# Patient Record
Sex: MALE | Race: WHITE
[De-identification: names, ages, dates, MRNs, and addresses within clinical notes are randomized per-mention and may not be internally consistent; named-entity substitution may affect disease eponyms.]

---

## 2023-04-09 ENCOUNTER — HOSPITAL ENCOUNTER (EMERGENCY)
Dept: HOSPITAL 33 - ED | Age: 38
Discharge: ANOTHER HOSPITAL | End: 2023-04-09
Payer: SELF-PAY

## 2023-04-09 VITALS — HEART RATE: 92 BPM | DIASTOLIC BLOOD PRESSURE: 69 MMHG | OXYGEN SATURATION: 97 % | SYSTOLIC BLOOD PRESSURE: 119 MMHG

## 2023-04-09 DIAGNOSIS — S81.812A: Primary | ICD-10-CM
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
## 2023-04-09 DIAGNOSIS — Z72.0: ICD-10-CM

## 2023-04-09 PROCEDURE — 99284 EMERGENCY DEPT VISIT MOD MDM: CPT

## 2023-04-09 PROCEDURE — 73590 X-RAY EXAM OF LOWER LEG: CPT

## 2023-04-09 PROCEDURE — 90715 TDAP VACCINE 7 YRS/> IM: CPT

## 2023-04-09 PROCEDURE — 90471 IMMUNIZATION ADMIN: CPT

## 2023-04-09 PROCEDURE — 96365 THER/PROPH/DIAG IV INF INIT: CPT

## 2023-04-09 PROCEDURE — 96375 TX/PRO/DX INJ NEW DRUG ADDON: CPT

## 2023-04-09 PROCEDURE — 36000 PLACE NEEDLE IN VEIN: CPT

## 2023-04-09 PROCEDURE — 96374 THER/PROPH/DIAG INJ IV PUSH: CPT

## 2023-04-09 NOTE — ERPHSYRPT
- History of Present Illness


Time Seen by Provider: 04/09/23 13:08


Source: patient, family


Exam Limitations: no limitations


Patient Subjective Stated Complaint: Fell and cut left lower leg.


Physician History: 





Fell outside, cut lower left leg, painful, limited movement, no other injury.


Method of Injury: direct blow, fell


Occurred: just prior to arrival


Quality: constant


Severity of Pain-Max: moderate


Severity of Pain-Current: moderate


Lower Extremities Pain: leg: left (contused laceration)


Modifying Factors: Improves With: rest.  Worsens With: movement


Associated Symptoms: none


Allergies/Adverse Reactions: 








No Known Drug Allergies Allergy (Verified 04/09/23 13:02)


   





Home Medications: 








No Reportable Medications [No Reported Medications]  04/09/23 [History]





Hx Tetanus, Diphtheria Vaccination/Date Given: Yes


Hx Influenza Vaccination/Date Given: No


Hx Pneumococcal Vaccination/Date Given: No





- Review of Systems


Constitutional: No Symptoms


Eyes: No Symptoms


Ears, Nose, & Throat: No Symptoms


Respiratory: No Symptoms


Cardiac: No Symptoms


Abdominal/Gastrointestinal: No Symptoms


Genitourinary Symptoms: No Symptoms


Musculoskeletal: No Symptoms


Skin: No Symptoms


Neurological: No Symptoms


Psychological: No Symptoms


Endocrine: No Symptoms


Hematologic/Lymphatic: No Symptoms


All Other Systems: Reviewed and Negative





- Past Medical History


Pertinent Past Medical History: No


Neurological History: No Pertinent History


Cardiac History: No Pertinent History


Respiratory History: No Pertinent History


Endocrine Medical History: No Pertinent History


Musculoskeletal History: No Pertinent History


GI Medical History: No Pertinent History


 History: No Pertinent History


Psycho-Social History: No Pertinent History


Male Reproductive Disorders: No Pertinent History





- Past Surgical History


Past Surgical History: No


Neuro Surgical History: No Pertinent History


Cardiac: No Pertinent History


Respiratory: No Pertinent History


Gastrointestinal: No Pertinent History


Genitourinary: No Pertinent History


Musculoskeletal: No Pertinent History


Male Surgical History: No Pertinent History





- Social History


Smoking Status: Current every day smoker


How long have you smoked: 3


Exposure to second hand smoke: No


Drug Use: none


Patient Lives Alone: No


Significant Family History: no pertinent family hx





- Nursing Vital Signs


Nursing Vital Signs: 


                               Initial Vital Signs











Temperature  98.0 F   04/09/23 13:04


 


Pulse Rate  93 H  04/09/23 13:04


 


Respiratory Rate  20   04/09/23 13:04


 


Blood Pressure  122/79   04/09/23 13:04


 


O2 Sat by Pulse Oximetry  98   04/09/23 13:04








                                   Pain Scale











Pain Intensity                 8

















- Physical Exam


General Appearance: mild distress


Eyes, Ears, Nose, Throat Exam: normal ENT inspection


Neck Exam: normal inspection, non-tender


Cardiovascular/Respiratory Exam: chest non-tender, normal breath sounds


Gastrointestinal/Abdominal Exam: non-tender, soft


Back Exam: normal inspection, normal range of motion


Legs Exam: left leg: bone tenderness, limited range of motion, pain, soft tissue

 tenderness, swelling


Neuro/Tendon Exam: normal sensation, normal motor functions, normal tendon 

functions


Mental Status Exam: alert, oriented x 3, cooperative, other (mild intoxication)


Skin Exam: normal color, warm, dry





- Course


Nursing assessment & vital signs reviewed: Yes





- Radiology Exams


  ** Lower Leg


X-ray Interpretation: Interpreted by me, Negative, Other (STS)


Ordered Tests: 


                               Active Orders 24 hr











 Category Date Time Status


 


 IV Insertion STAT Care  04/09/23 13:10 Active


 


 LOWER LEG Stat Exams  04/09/23 13:17 Taken








Medication Summary














Discontinued Medications














Generic Name Dose Route Start Last Admin





  Trade Name Freq  PRN Reason Stop Dose Admin


 


Diphtheria/Tetanus/Acell Pertussis  0.5 ml  04/09/23 13:15  04/09/23 13:32





  Tdap --Diph,Pertuss(Acell),Tet Vac/Pf 0.5 Ml Vial  IM  04/09/23 13:16  0.5 ml





  .ONCE ONE   Administration


 


Diphtheria/Tetanus/Acell Pertussis  Confirm  04/09/23 13:31 





  Tdap --Diph,Pertuss(Acell),Tet Vac/Pf 0.5 Ml Vial  Administered  04/09/23 13

:32 





  Dose  





  0.5 ml  





  IM  





  .STK-MED ONE  


 


Hydromorphone HCl  1 mg  04/09/23 13:09  04/09/23 13:14





  Hydromorphone 1 Mg/1ml Inj*** 1 Mg/Ml Syringe  IV  04/09/23 13:10  1 mg





  STAT ONE   Administration


 


Hydromorphone HCl  Confirm  04/09/23 13:11 





  Hydromorphone 1 Mg/1ml Inj*** 1 Mg/Ml Syringe  Administered  04/09/23 13:12 





  Dose  





  1 mg  





  .ROUTE  





  .STK-MED ONE  


 


Cefazolin Sodium/Dextrose  1 gm in 50 mls @ 100 mls/hr  04/09/23 13:11  04/09/23

 13:45





  Kefzol 1 Gm/50 Ml Premix**  IV  04/09/23 13:40  Infused





  STAT STA   Infusion


 


Cefazolin Sodium/Dextrose  Confirm  04/09/23 13:11 





  Kefzol 1 Gm/50 Ml Premix**  Administered  04/09/23 13:12 





  Dose  





  1 gm in 50 mls @ ud  





  IV  





  .STK-MED ONE  


 


Lidocaine HCl  Confirm  04/09/23 13:12 





  Lidocaine Hcl 1% 20 Ml Mdv*** 20 Ml Ml  Administered  04/09/23 13:13 





  Dose  





  1 ml  





  .ROUTE  





  .STK-MED ONE  


 


Ondansetron HCl  4 mg  04/09/23 13:09  04/09/23 13:14





  Ondansetron Hcl 4 Mg/2 Ml Vial  IV  04/09/23 13:10  4 mg





  STAT ONE   Administration


 


Ondansetron HCl  Confirm  04/09/23 13:11 





  Ondansetron Hcl 4 Mg/2 Ml Vial  Administered  04/09/23 13:12 





  Dose  





  4 mg  





  .ROUTE  





  .STK-MED ONE  














- Progress


Progress: improved, re-examined


Progress Note: 





04/09/23 13:54


The wound was anesthetized with lidocaine, well irrigated, is a complex wound 

with clinical suspicion for muscle injury, possible tendon injury, measures 

about 11 cm across and 3 cm in width, depth about 1-2 cm, extends to bone. NVI 

grossly, possible limitations in exam due to EtOH use.  Given one gm Ancef IV 

and boostrix shot. Dilaudid one mg with zofran for pain control. Wound dressed. 

Transfer to [facility] due to complex nature of wound.


04/09/23 13:59





Discussed with : Other (ED Dr. Pineda at [facility], accepts in transfer via EMS, 

stable)


Counseled pt/family regarding: diagnosis, rad results (Need for transfer.)





- Departure


Departure Disposition: Transfer


Clinical Impression: 


Lower leg laceration with complication


Qualifiers:


 Encounter type: initial encounter Laterality: left Qualified Code(s): S81.812A 

- Laceration without foreign body, left lower leg, initial encounter





Condition: Stable


Critical Care Time: No

## 2023-04-09 NOTE — XRAY
Indication: Trauma/laceration.



Comparison: None



2 view left lower leg demonstrates proximal anterior soft tissue

swelling/laceration.  No other bony, articular, or soft tissue abnormalities.